# Patient Record
Sex: MALE | Race: WHITE | NOT HISPANIC OR LATINO | Employment: FULL TIME | URBAN - NONMETROPOLITAN AREA
[De-identification: names, ages, dates, MRNs, and addresses within clinical notes are randomized per-mention and may not be internally consistent; named-entity substitution may affect disease eponyms.]

---

## 2018-10-05 ENCOUNTER — OFFICE VISIT (OUTPATIENT)
Dept: URGENT CARE | Facility: PHYSICIAN GROUP | Age: 28
End: 2018-10-05
Payer: COMMERCIAL

## 2018-10-05 VITALS
OXYGEN SATURATION: 95 % | HEIGHT: 72 IN | DIASTOLIC BLOOD PRESSURE: 90 MMHG | BODY MASS INDEX: 32.51 KG/M2 | WEIGHT: 240 LBS | TEMPERATURE: 97.1 F | SYSTOLIC BLOOD PRESSURE: 138 MMHG | HEART RATE: 88 BPM | RESPIRATION RATE: 16 BRPM

## 2018-10-05 DIAGNOSIS — L02.811 ABSCESS OF HEAD: ICD-10-CM

## 2018-10-05 PROCEDURE — 99203 OFFICE O/P NEW LOW 30 MIN: CPT | Performed by: NURSE PRACTITIONER

## 2018-10-05 RX ORDER — DOXYCYCLINE HYCLATE 100 MG/1
100 CAPSULE ORAL 2 TIMES DAILY
Qty: 20 CAP | Refills: 0 | Status: SHIPPED | OUTPATIENT
Start: 2018-10-05 | End: 2018-10-12

## 2018-10-05 ASSESSMENT — ENCOUNTER SYMPTOMS
CHILLS: 0
FEVER: 0
MYALGIAS: 0
HEADACHES: 0
NAUSEA: 0

## 2018-10-05 NOTE — PROGRESS NOTES
"Subjective:      Jose Burgess is a 27 y.o. male who presents with Cyst (Head x 2 months )            HPI New problem. 27 year old male presenting with cyst on left parietal area of scalp that is now infected. He reports pain in this area, redness, swelling and some discharge when he poked it. Denies fever, chills, myalgia, nausea or headache. Has had other sores similar to this.  Augmentin; Bactrim ds; and Cephalosporins  No current outpatient prescriptions on file prior to visit.     No current facility-administered medications on file prior to visit.      Social History     Social History   • Marital status:      Spouse name: N/A   • Number of children: N/A   • Years of education: N/A     Occupational History   • Not on file.     Social History Main Topics   • Smoking status: Former Smoker     Quit date: 1/1/2015   • Smokeless tobacco: Never Used   • Alcohol use Yes      Comment: \"Rarely\"   • Drug use: Yes     Types: Marijuana   • Sexual activity: Not on file     Other Topics Concern   • Not on file     Social History Narrative   • No narrative on file     family history is not on file.      Review of Systems   Constitutional: Negative for chills, fever and malaise/fatigue.   Gastrointestinal: Negative for nausea.   Musculoskeletal: Negative for myalgias.   Skin: Positive for rash.   Neurological: Negative for headaches.          Objective:     /90 (BP Location: Right arm, Patient Position: Sitting, BP Cuff Size: Adult)   Pulse 88   Temp 36.2 °C (97.1 °F) (Temporal)   Resp 16   Ht 1.829 m (6')   Wt 108.9 kg (240 lb)   SpO2 95%   BMI 32.55 kg/m²      Physical Exam   Constitutional: He is oriented to person, place, and time. He appears well-developed and well-nourished. No distress.   Cardiovascular: Normal rate, regular rhythm and normal heart sounds.    No murmur heard.  Pulmonary/Chest: Effort normal and breath sounds normal. No respiratory distress.   Musculoskeletal: Normal range of " motion.   Neurological: He is alert and oriented to person, place, and time.   Skin: Skin is warm and dry.   He has a small, non fluctuant lesion on left parietal area of head with some scabbing.    Psychiatric: He has a normal mood and affect. His behavior is normal. Thought content normal.   Nursing note and vitals reviewed.              Assessment/Plan:     1. Abscess of head  doxycycline (VIBRAMYCIN) 100 MG Cap    REFERRAL TO GENERAL SURGERY     Referral to surgery for removal of cyst.  Doxycycline and warm, moist compresses.   Differential diagnosis, natural history, supportive care, and indications for immediate follow-up discussed at length.